# Patient Record
(demographics unavailable — no encounter records)

---

## 2024-10-10 NOTE — END OF VISIT
[] : Resident [FreeTextEntry3] : Pt. here for annual physical exam.  Had Tdap 8/23/18, flu vaccine 9/30/24.  Had blood work and FOBT ordered 9/30/24.  EKG ordered.  Had breast sono 9/17/24 BIRADS-2, and CAB did not approve for colonoscopy.  Seen ophthal. 4/16/24.  Medication renewed.  Follow breast surgery Dr. Schaeffer 10/28/24 for h/o breast cancer, ENT 3/20/25,  4/29/25 for left atrophic kidney/staghorn calculus/left hydronephrosis, cardiology 7/22/25, and follow renal Dr. Frost for staghorn calculus.  RTC 3 months.  Pt. has initial /66, repeat /80, discussed with group home RN Kenna, advised to follow up with Dr. Frost regarding treatment plan for pt's BP, it may be beneficial to have pt. on low dose diuretic for better BP control.

## 2024-10-10 NOTE — ASSESSMENT
[FreeTextEntry1] : 63 yo female with PMHx of severe intellectual disability, HTN, HLD, and renal caliculi who presents for an annual physical exam.  # recent hospital for AMS - low BP, HCTZ and Norvasc were discontinued. - CTH EEG negative. - Now stable at her baseline   #HTN - reviewed BP readings from group home, BP mildly elevated - advised to keep documenting daily pressures. - continue Metoprolol 50mg BID & losartan 100mg qd  #Hyperlipidemia- RESOLVED - TG 42, LDL 99, HDL 82 as of 07/15/24 - continue ezetimibe & pravastatin  #Prediabetes - A1c 5.8% on 07/24 - counseled aide on diet and lifestyle modifications  #constipation: - c/w lactulose and polyethylene glycol  #anemia - RESOLVED - c/w FeSO4 solution - Hgb-13.1 07/24  #Renal Calculi and hydronephrosis - follows with urology 4/2025 - last renal US 03/2024- Stable severe left hydronephrosis. Multiple right renal stones measuring up to 0.8 cm. No definite right hydronephrosis.  #Bilateral Breast Masses - US Breast BI-RADS Category 2: Benign (9/2024) next screening in 9/2025 - f/u surgery- 10/24  #HCM - flu shot recieved on 09/30 - Vitals stable- BP high-> repeat stable - EKG - NSR - Tdap last dose in 2018. - PPSV 2013, next dose at age of 65. - Colonoscopy: Patient is not a candidate for colonoscopy per CAB and Dr. Whipple. - FOBT negative (04/2023), repeat ordered last visit - following with ophthalmology Dr Hurt - following with her podiatrist Dr Messer - Follows with pulmonary. - GYN referral for PAP screening - in Nov 2024 - labs from 07/2024 reviewed - medications refilled. - RTC in 3 months.

## 2024-10-10 NOTE — HISTORY OF PRESENT ILLNESS
[FreeTextEntry1] : annual physical exam [de-identified] : 61 yo female with PMHx of severe intellectual disability, HTN, HLD, and renal caliculi who presents for an annual physical exam. Today is accompanied by Jordyn Craig. She has follow up appointments scheduled with ENT cardio ophthalmology and urology.  The patient is non-verbal wheelchair bound and no complaints as per the aide.

## 2024-10-10 NOTE — PHYSICAL EXAM
[No Acute Distress] : no acute distress [Well Nourished] : well nourished [Well Developed] : well developed [Well-Appearing] : well-appearing [Normal Outer Ear/Nose] : the outer ears and nose were normal in appearance [No JVD] : no jugular venous distention [No Respiratory Distress] : no respiratory distress  [No Accessory Muscle Use] : no accessory muscle use [Clear to Auscultation] : lungs were clear to auscultation bilaterally [Normal Rate] : normal rate  [Regular Rhythm] : with a regular rhythm [Normal S1, S2] : normal S1 and S2 [No Murmur] : no murmur heard [No Carotid Bruits] : no carotid bruits [Pedal Pulses Present] : the pedal pulses are present [No Edema] : there was no peripheral edema [Soft] : abdomen soft [Non Tender] : non-tender [Non-distended] : non-distended [No HSM] : no HSM [Normal Bowel Sounds] : normal bowel sounds [Normal] : no posterior cervical lymphadenopathy and no anterior cervical lymphadenopathy [de-identified] : Severe contractures of the upper and lower limbs [de-identified] : Severely contracted

## 2024-10-10 NOTE — REVIEW OF SYSTEMS
[Negative] : Gastrointestinal [Fever] : no fever [Chills] : no chills [Night Sweats] : no night sweats [Recent Change In Weight] : ~T no recent weight change [Dysuria] : no dysuria [Hematuria] : no hematuria [Frequency] : no frequency [FreeTextEntry4] : Mouth partially open [de-identified] : wheel chair bound [FreeTextEntry1] : unable to elicit

## 2024-10-29 NOTE — PHYSICAL EXAM
[de-identified] : No adenopathy [de-identified] : Small and symmetrical, mixed fatty and glandular consistency.  Bilaterally inverted nipples which do mahogany with gentle manipulation, no discharge noted.  7 to 8 mm rounded cystic mass palpable in the left 4:00 axis, mobile with no overlying skin changes.  No other breast masses noted bilaterally no axillary adenopathy bilaterally.

## 2024-10-29 NOTE — DATA REVIEWED
[FreeTextEntry1] : Bilateral breast sonogram done last month showed no suspicious lesions.  Her medical condition does not allow adequate positioning for a screening mammogram  See attached medical consultation form from her SNF

## 2024-10-29 NOTE — ASSESSMENT
[FreeTextEntry1] : Benign bilateral breast examination.  The current palpable cyst corresponds to a finding on her prior breast sonogram from 2023, and likely represents a fluctuating benign cyst.  No further workup is thought to be necessary at this time.  The patient will return in 1 year for reexam, or sooner as needed.  A bilateral breast sonogram will be done next September and an appropriate requisition was provided for the facility.

## 2024-10-29 NOTE — HISTORY OF PRESENT ILLNESS
[de-identified] : The patient is brought here by her attendant from her residence for her annual breast surveillance examination.  The aide does not report any known abnormality in either breast.

## 2024-11-07 NOTE — PHYSICAL EXAM
[___cm] : a ~M [unfilled] ~Ucm mass was palpated deep to the nipple [Atrophy] : atrophy [Breast Nipple Inversion] : inverted [Vulvar Atrophy] : vulvar atrophy [Labia Majora] : normal [Normal] : normal [Discharge] : a  ~M vaginal discharge was present [FreeTextEntry1] : vulvar area and labia had scaly skin changes, whitis discharge [FreeTextEntry5] : bimanual not done because of physical state

## 2024-11-07 NOTE — PLAN
[FreeTextEntry1] : 63 CHRISTINA w a PMH of Intellectual Disability, atrophic vaginitis, stable sub-centimeter breast mass (BIRADS 2 2023), iron deficiency anemia and constipation presents for her annual Gyn exam.  Bed bound and contracted  #GYN Annual Exam. Unable to get into lithotomy positio - external exam done -fungus infection present by external vaginal area (vulvovaginitis) -start teraconazole external x 3 weeks to labia and perineum  -start diflucan PO x 1 - keep perineum dry  # Breast - breast exam with no palpable lesions - right sided mass seen on  breast ultrasound shows stable ovoid mass  - next breast sono 9/2025 - next   RTC 1 year or PRN

## 2024-11-07 NOTE — HISTORY OF PRESENT ILLNESS
[FreeTextEntry1] : 63 CHRISTINA w a PMH of Intellectual Disability, atrophic vaginitis, stable sub-centimeter breast mass (BIRADS 2 2023), iron deficiency anemia and constipation presents for her annual Gyn exam.  Patient's non verbal, per aid patient has not had vaginal bleeding or any other GYN issues

## 2024-11-26 NOTE — HISTORY OF PRESENT ILLNESS
[FreeTextEntry1] : FRANCA CALDERON is a 63-year-old female who presents with left atrophic kidney secondary to staghorn calculus, left hydronephrosis, non-obstructing right renal stones.   Contacted by nephrology with request to place Connelly catheter for 24-hour urine collection.  They had difficulty placing a Connelly as they could not find urethral meatus.  Please see separate note for further discussion. Discussed with aide no change in symptoms in the last year, no obvious hematuria  Previously: KUB 03/25/2024  IMPRESSION: Nonspecific gaseous distention of bowel loops. No definite bowel obstruction. Large rectal stool burden. Likely (given significant rotation) bilateral renal stones as described. (Calcifications: Bilateral likely renal radiodensities as follows: - 4.3 x 3.2 cm likely left renal pelvic stone -1 cm likely right renal midpole stone -0.7 cm likely right renal upper pole stone -0.4 cm likely right renal midpole stone -Additional punctate radiodensities likely overlying the right kidney may represent small stones.)  RBUS 03/25/2024 - images independently reviewed by me  IMPRESSION: Stable severe left hydronephrosis. Multiple right renal stones measuring up to 0.8 cm. No definite right hydronephrosis. (Urinary bladder wall thickness measures 0.3 cm.)  Litholink 2021 shows hypocitraturia and hyperoxaluria.  Good urine volume  Patient presents with incidental finding of a left staghorn calculus causing an atrophic chronically obstructed left kidney.  KUB images from1/15/2021- demonstrating no change in Lt large 4cm renal stone, Rt kidney with stable nephrolithiasis, perhaps slightly larger.  US shows stable hydronephrosis, Rt kidney with non obstructing stones  Denies  PMH including recurrent UTIs.  Family History: No  malignancies PMHx: G-tube in place  Old records reviewed Stones have been present and stable since at least 2018 on CT. UCx neg x 10.  Cr 0.7 CT abdomen and pelvis images reviewed December 2019 and there is also a 6 mm nonobstructing right upper pole stone and a lower pole stone.

## 2024-11-26 NOTE — ASSESSMENT
[FreeTextEntry1] : FRANCA CALDERON is a 63-year-old female who presents with left atrophic kidney secondary to staghorn calculus, left hydronephrosis, non-obstructing right renal stones.  Status post catheterization for 24-hour urine collection.  We attempted to place Masters catheter today however there was difficulty in visualizing the meatus.  Although the catheter was flushing well, there was no return of urine over the next hour.  Recommend removing the Masters catheter if no urine output in next hour.  Nursing home aid preferred to remove masters catheter in nursing home  Suspecting that the patient has urethral erosion (into vagina)  from previous chronic Masters. since Masters catheter insertion would require move invasive instrumentation, I am not recommending it at this time for a 24 hour urine collection  Recommending continuing monitoring of nephrolithiasis.  Continue follow-up with nephrology fu w urology as planned  Management impacted by social determinants of health given patient given pt unable to provide history stable nephrolithiasis   As prior patient has been asymptomatic from her left atrophic and chronically obstructed kidney I am recommending observation at this time as opposed to nephrectomy.  We will also observe the right non obstructing renal stones however these are a much greater concern as she has a functional solitary kidney at this time.     .

## 2024-11-26 NOTE — ADDENDUM
[FreeTextEntry1] : Patient's note was transcribed by physician assistant Arnaldo Syed, under the supervision of Dr. Conner. I, Dr. Conner, have reviewed the patient's chart and agree that it aligns with my medical decisions.

## 2025-01-16 NOTE — REVIEW OF SYSTEMS
[Fever] : no fever [Chills] : no chills [Night Sweats] : no night sweats [Recent Change In Weight] : ~T no recent weight change [Dysuria] : no dysuria [Hematuria] : no hematuria [Frequency] : no frequency [Negative] : Gastrointestinal [FreeTextEntry4] : Mouth partially open [de-identified] : wheel chair bound [FreeTextEntry1] : unable to attain

## 2025-01-16 NOTE — END OF VISIT
[] : Resident [FreeTextEntry3] : Pt. here for follow up.  Had Tdap 8/23/18, flu vaccine 9/30/24.  Had blood work 12/31/24 25-OH Vit. D 78, TSH 3.61, A1C 5.2, , triglyceride 42, and FOBT 10/14/24 (-).  Had breast sono 9/17/24 BIRADS-2, and CAB did not approve for colonoscopy.  Seen Gyn 11/7/24, ophthal. 4/16/24.  Will start amlodipine 2.5mg daily.  Medication renewed.  Advised staff to schedule ophthal. appointment 4/2025.  Follow ENT 3/20/25,  4/29/25 for left atrophic kidney/staghorn calculus/left hydronephrosis, cardiology 7/22/25, breast surgery Dr. Schaeffer 10/27/25 for h/o breast cancer, med/Gyn 11/6/25, and follow renal Dr. Frost for staghorn calculus.  RTC 3 months.

## 2025-01-16 NOTE — REVIEW OF SYSTEMS
[Fever] : no fever [Chills] : no chills [Night Sweats] : no night sweats [Recent Change In Weight] : ~T no recent weight change [Dysuria] : no dysuria [Hematuria] : no hematuria [Frequency] : no frequency [Negative] : Gastrointestinal [FreeTextEntry4] : Mouth partially open [de-identified] : wheel chair bound [FreeTextEntry1] : unable to attain

## 2025-01-16 NOTE — HISTORY OF PRESENT ILLNESS
[FreeTextEntry1] : follow up [de-identified] : 64 yo female with PMHx of severe intellectual disability, HTN, HLD, and renal caliculi who presents to office for a follow up appointment. Accompanied by Jordyn Craig.

## 2025-01-16 NOTE — PHYSICAL EXAM
[Well-Appearing] : well-appearing [Normal Outer Ear/Nose] : the outer ears and nose were normal in appearance [No JVD] : no jugular venous distention [Normal S1, S2] : normal S1 and S2 [No Murmur] : no murmur heard [No Carotid Bruits] : no carotid bruits [Pedal Pulses Present] : the pedal pulses are present [No Edema] : there was no peripheral edema [Normal Bowel Sounds] : normal bowel sounds [Normal] : no posterior cervical lymphadenopathy and no anterior cervical lymphadenopathy [de-identified] : Severely contracted  [No Acute Distress] : no acute distress [Well Nourished] : well nourished [Well Developed] : well developed [Normal Sclera/Conjunctiva] : normal sclera/conjunctiva [PERRL] : pupils equal round and reactive to light [EOMI] : extraocular movements intact [No Lymphadenopathy] : no lymphadenopathy [Supple] : supple [No Respiratory Distress] : no respiratory distress  [No Accessory Muscle Use] : no accessory muscle use [Clear to Auscultation] : lungs were clear to auscultation bilaterally [Normal Rate] : normal rate  [Regular Rhythm] : with a regular rhythm [Soft] : abdomen soft [Non Tender] : non-tender [Non-distended] : non-distended [No HSM] : no HSM [de-identified] : Severe contractures of the upper and lower limbs

## 2025-01-16 NOTE — HISTORY OF PRESENT ILLNESS
[FreeTextEntry1] : follow up [de-identified] : 64 yo female with PMHx of severe intellectual disability, HTN, HLD, and renal caliculi who presents to office for a follow up appointment. Accompanied by Jordyn Craig.

## 2025-01-16 NOTE — PHYSICAL EXAM
[Well-Appearing] : well-appearing [Normal Outer Ear/Nose] : the outer ears and nose were normal in appearance [No JVD] : no jugular venous distention [Normal S1, S2] : normal S1 and S2 [No Murmur] : no murmur heard [No Carotid Bruits] : no carotid bruits [Pedal Pulses Present] : the pedal pulses are present [No Edema] : there was no peripheral edema [Normal Bowel Sounds] : normal bowel sounds [Normal] : no posterior cervical lymphadenopathy and no anterior cervical lymphadenopathy [de-identified] : Severely contracted  [No Acute Distress] : no acute distress [Well Nourished] : well nourished [Well Developed] : well developed [Normal Sclera/Conjunctiva] : normal sclera/conjunctiva [PERRL] : pupils equal round and reactive to light [EOMI] : extraocular movements intact [No Lymphadenopathy] : no lymphadenopathy [Supple] : supple [No Respiratory Distress] : no respiratory distress  [No Accessory Muscle Use] : no accessory muscle use [Clear to Auscultation] : lungs were clear to auscultation bilaterally [Normal Rate] : normal rate  [Regular Rhythm] : with a regular rhythm [Soft] : abdomen soft [Non Tender] : non-tender [Non-distended] : non-distended [No HSM] : no HSM [de-identified] : Severe contractures of the upper and lower limbs

## 2025-01-16 NOTE — ASSESSMENT
[FreeTextEntry1] : 64 yo female with PMHx of severe intellectual disability, HTN, HLD, and renal caliculi who presents for a follow up. No new complaints.   #HTN - /95 in office, repeat /82  - continue Metoprolol 50mg BID & losartan 100mg qd - HCTZ and norvasc discontinued due to AMS 2/2 low BP  - advised to keep documenting daily pressures. - added amlodipine 2.5mg QD  #Hyperlipidemia- RESOLVED - TG 42, , HDL 86, chol 206 as of 12/20/2024  - continue ezetimibe 10mg & pravastatin 40mg  #Prediabetes - A1c 5.8% on 07/24--> 5.2% on 12/30/2024 - counseled aide on diet and lifestyle modifications - no need for medications at this time  #constipation: - c/w lactulose and polyethylene glycol  #anemia - RESOLVED - c/w FeSO4 solution - Hgb- 14.2, MCV 75.7 on 12/30/2024  #Renal Calculi and hydronephrosis - follow up with urology 4/2025 - last renal US 03/2024- Stable severe left hydronephrosis. Multiple right renal stones measuring up to 0.8 cm. No definite right hydronephrosis.  #Bilateral Breast Masses - US Breast BI-RADS Category 2: Benign (9/2024) next screening in 9/2025 - right sided mass seen on breast ultrasound shows stable ovoid mass - next breast sono 9/2025 - RTC in one year - saw surgery 10/2024--> no intervention at this time, fu in 1 year  #HCM - flu shot recieved on 09/30 - Tdap last dose in 2018. - PPSV 2013, next dose at age of 65. - Colonoscopy: Patient is not a candidate for colonoscopy per CAB and Dr. Whipple, FOBT negative (04/2023), repeat ordered last visit - following with ophthalmology Dr Hurt, last seen 04/2024, fu in one year  - following with her podiatrist Dr Messer - medications refilled. - RTC in 3 months.

## 2025-06-03 NOTE — PHYSICAL EXAM
[No Acute Distress] : no acute distress [Well Nourished] : well nourished [Well Developed] : well developed [Normal Sclera/Conjunctiva] : normal sclera/conjunctiva [PERRL] : pupils equal round and reactive to light [EOMI] : extraocular movements intact [No Lymphadenopathy] : no lymphadenopathy [Supple] : supple [No Respiratory Distress] : no respiratory distress  [No Accessory Muscle Use] : no accessory muscle use [Clear to Auscultation] : lungs were clear to auscultation bilaterally [Normal Rate] : normal rate  [Regular Rhythm] : with a regular rhythm [Soft] : abdomen soft [Non Tender] : non-tender [Non-distended] : non-distended [No HSM] : no HSM [de-identified] : Severe contractures of the upper and lower limbs

## 2025-06-03 NOTE — HISTORY OF PRESENT ILLNESS
[FreeTextEntry1] : follow up [de-identified] : 64 yo female with PMHx of severe intellectual disability, HTN, HLD, and renal caliculi who presents to office for a follow up appointment. Accompanied by Jordyn Avila. No new complaints.

## 2025-06-03 NOTE — ASSESSMENT
[FreeTextEntry1] : 64 yo female with PMHx of severe intellectual disability, HTN, HLD, and renal caliculi who presents for a follow up. No new complaints.   #HTN - /112mmhg - continue Metoprolol 50mg BID, Amlodipine 2.5mg, HCT 12.5mg qd & losartan 100mg qd - aide brought the BP diary for May; BP ranging from 120-160s systolic and 70-90s diastolic; overall slightly elevated  - Increase amlodipine 2.5mg to 5mg QD --- called the NH and updated the nurse on the changes.   #Hyperlipidemia- RESOLVED - TG 42, , HDL 86, chol 206 as of 12/20/2024  - continue ezetimibe 10mg & pravastatin 40mg  #Prediabetes - A1c 5.8% on 07/24--> 5.2% on 12/30/2024 - counseled aide on diet and lifestyle modifications - no need for medications at this time  #constipation: - c/w lactulose and polyethylene glycol  #anemia - RESOLVED - c/w FeSO4 solution - Hgb- 14.2, MCV 75.7 on 12/30/2024  #Renal Calculi and hydronephrosis - follows with urology - last renal US 03/2024- Stable severe left hydronephrosis. Multiple right renal stones measuring up to 0.8 cm. No definite right hydronephrosis.  #Bilateral Breast Masses - US Breast BI-RADS Category 2: Benign (9/2025) next screening in 9/2026 - right sided mass seen on breast ultrasound shows stable ovoid mass - next breast sono 9/2026 - RTC in one year - saw surgery 10/2024--> no intervention at this time, fu in 1 year - follows with breast surgeon  #HCM - flu shot recieved on 09/30 - Tdap last dose in 2018. - PPSV 2013, next dose at age of 65. - Colonoscopy: Patient is not a candidate for colonoscopy per CAB and Dr. Whipple, FOBT negative (04/2023), repeat ordered last visit - following with ophthalmology Dr uHrt, last seen 04/2025, fu in one year  - following with her podiatrist Dr Messer - medications refilled, routine labs ordered.  - RTC in 3 months.

## 2025-06-03 NOTE — END OF VISIT
[] : Resident [FreeTextEntry3] : Pt. here for follow up.  Had Tdap 8/23/18, flu vaccine 9/30/24.  Had breast sono 9/17/24 BIRADS-2, and CAB did not approve for colonoscopy.  Seen Gyn 11/7/24, ophthal. 4/17/25.  /112 on amlodipine, losartan, metoprolol, HCTZ; will increase amlodipine to 5mg daily.  Medication renewed.  Blood work ordered.  Follow ENT 6/25/25,  6/30/25 for left atrophic kidney/staghorn calculus/left hydronephrosis, cardiology 7/22/25, breast surgery Dr. Schaeffer 10/27/25 for h/o breast cancer, med/Gyn 11/6/25, ophthal. 4/7/26, and follow renal Dr. Frost for staghorn calculus.  RTC 3 months.

## 2025-07-22 NOTE — REASON FOR VISIT
[Hyperlipidemia] : hyperlipidemia [Hypertension] : hypertension [Formal Caregiver] : formal caregiver

## 2025-07-22 NOTE — PHYSICAL EXAM
[Frail] : frail [Normal] : clear lung fields, good air entry, no respiratory distress [Soft] : abdomen soft [No Edema] : no edema [No Rash] : no rash [de-identified] : G tube present [de-identified] : wheelchair bound [de-identified] : severe contracture of upper and lower limbs [de-identified] : nonverbal at baseline

## 2025-07-24 NOTE — PROCEDURE
[Recalcitrant Symptoms] : recalcitrant symptoms  [None] : none [Flexible Endoscope] : examined with the flexible endoscope [Allergic] : allergic signs [Nasal Mucosa Crusts / Sores] : no lesions [Nasal Mucosa] : no polyps [de-identified] : Kale Crowe [FreeTextEntry6] : The following anatomic sites were directly examined in a sequential fashion: The scope was introduced in the nasal passage between the middle and inferior turbinates to exam the inferior portion of the middle meatus and the fontanelle, as well as the maxillary ostia. Next, the scope was passed medically and posteriorly to the middle turbinates to examine the sphenoethmoid recess and the superior turbinate region.

## 2025-07-24 NOTE — ASSESSMENT
[FreeTextEntry1] : Impacted cerumen removed with curette and irrigation. Procedure well tolerated. Continue to use Debrox and Ipratropium Bromide nasal spray. RTC in 6 months.

## 2025-07-24 NOTE — PHYSICAL EXAM
[Normal] : mucosa is normal [Midline] : trachea located in midline position [de-identified] : cerumen impaction bilateral. Wax removed with micro suciton.  [Nasal Endoscopy Performed] : nasal endoscopy was performed, see procedure section for findings [de-identified] : edema

## 2025-07-24 NOTE — REASON FOR VISIT
[Subsequent Evaluation] : a subsequent evaluation for [FreeTextEntry2] : chronic rhinitis, clogged ears.

## 2025-07-24 NOTE — HISTORY OF PRESENT ILLNESS
[FreeTextEntry1] : Pt returns today c/o chronic rhinitis, clogged ears. Accompanied by aide. Reports that she is here today for ear checkup and chronic vasomotor rhinitis. Continues Asterpro, Ipratropium. Increased nasal secretions during high pollen days. No further complaints or concerns.